# Patient Record
Sex: MALE | Race: WHITE
[De-identification: names, ages, dates, MRNs, and addresses within clinical notes are randomized per-mention and may not be internally consistent; named-entity substitution may affect disease eponyms.]

---

## 2018-09-11 ENCOUNTER — HOSPITAL ENCOUNTER (EMERGENCY)
Dept: HOSPITAL 57 - BURERS | Age: 43
Discharge: HOME | End: 2018-09-11
Payer: SELF-PAY

## 2018-09-11 DIAGNOSIS — I25.2: ICD-10-CM

## 2018-09-11 DIAGNOSIS — E78.5: ICD-10-CM

## 2018-09-11 DIAGNOSIS — F31.9: ICD-10-CM

## 2018-09-11 DIAGNOSIS — Z86.73: ICD-10-CM

## 2018-09-11 DIAGNOSIS — E11.9: ICD-10-CM

## 2018-09-11 DIAGNOSIS — I10: ICD-10-CM

## 2018-09-11 DIAGNOSIS — L03.313: Primary | ICD-10-CM

## 2018-09-11 PROCEDURE — 90715 TDAP VACCINE 7 YRS/> IM: CPT

## 2018-09-11 PROCEDURE — 87077 CULTURE AEROBIC IDENTIFY: CPT

## 2018-09-11 PROCEDURE — 87186 SC STD MICRODIL/AGAR DIL: CPT

## 2018-09-11 PROCEDURE — 87070 CULTURE OTHR SPECIMN AEROBIC: CPT

## 2018-09-11 PROCEDURE — 90471 IMMUNIZATION ADMIN: CPT

## 2018-09-11 PROCEDURE — 10060 I&D ABSCESS SIMPLE/SINGLE: CPT

## 2018-09-11 PROCEDURE — 87205 SMEAR GRAM STAIN: CPT

## 2018-11-06 ENCOUNTER — HOSPITAL ENCOUNTER (EMERGENCY)
Dept: HOSPITAL 57 - BURERS | Age: 43
Discharge: HOME | End: 2018-11-06
Payer: SELF-PAY

## 2018-11-06 DIAGNOSIS — Z79.4: ICD-10-CM

## 2018-11-06 DIAGNOSIS — I10: ICD-10-CM

## 2018-11-06 DIAGNOSIS — F31.9: ICD-10-CM

## 2018-11-06 DIAGNOSIS — Z79.891: ICD-10-CM

## 2018-11-06 DIAGNOSIS — S43.402A: Primary | ICD-10-CM

## 2018-11-06 DIAGNOSIS — Z79.82: ICD-10-CM
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
## 2018-11-06 DIAGNOSIS — Z79.899: ICD-10-CM

## 2018-11-06 NOTE — RAD
THREE VIEWS LEFT SHOULDER:

 

Comparison: None. 

 

History: Left shoulder pain after fall. 

 

FINDINGS: 

Three views of the left shoulder shows no evidence of acute fracture or dislocation. No degenerative 
changes are seen. No soft tissue swelling is present. 

 

IMPRESSION: 

Unremarkable exam. 

 

POS: [facility]

## 2018-12-20 ENCOUNTER — HOSPITAL ENCOUNTER (INPATIENT)
Dept: HOSPITAL 92 - ERS | Age: 43
LOS: 3 days | Discharge: HOME | DRG: 254 | End: 2018-12-23
Attending: FAMILY MEDICINE | Admitting: FAMILY MEDICINE
Payer: SELF-PAY

## 2018-12-20 VITALS — BODY MASS INDEX: 26.2 KG/M2

## 2018-12-20 DIAGNOSIS — F31.9: ICD-10-CM

## 2018-12-20 DIAGNOSIS — E11.65: ICD-10-CM

## 2018-12-20 DIAGNOSIS — G47.30: ICD-10-CM

## 2018-12-20 DIAGNOSIS — Z95.810: ICD-10-CM

## 2018-12-20 DIAGNOSIS — I25.5: ICD-10-CM

## 2018-12-20 DIAGNOSIS — I11.0: Primary | ICD-10-CM

## 2018-12-20 DIAGNOSIS — I25.10: ICD-10-CM

## 2018-12-20 DIAGNOSIS — I50.23: ICD-10-CM

## 2018-12-20 DIAGNOSIS — I25.2: ICD-10-CM

## 2018-12-20 LAB
ALBUMIN SERPL BCG-MCNC: 3.5 G/DL (ref 3.5–5)
ALP SERPL-CCNC: 89 U/L (ref 40–150)
ALT SERPL W P-5'-P-CCNC: (no result) U/L (ref 8–55)
ANION GAP SERPL CALC-SCNC: 14 MMOL/L (ref 10–20)
AST SERPL-CCNC: 8 U/L (ref 5–34)
BASOPHILS # BLD AUTO: 0 THOU/UL (ref 0–0.2)
BASOPHILS NFR BLD AUTO: 0.1 % (ref 0–1)
BILIRUB SERPL-MCNC: 0.6 MG/DL (ref 0.2–1.2)
BUN SERPL-MCNC: 12 MG/DL (ref 8.9–20.6)
CALCIUM SERPL-MCNC: 8.5 MG/DL (ref 7.8–10.44)
CHLORIDE SERPL-SCNC: 105 MMOL/L (ref 98–107)
CK SERPL-CCNC: 35 U/L (ref 30–200)
CO2 SERPL-SCNC: 26 MMOL/L (ref 22–29)
CREAT CL PREDICTED SERPL C-G-VRATE: 0 ML/MIN (ref 70–130)
EOSINOPHIL # BLD AUTO: 0.2 THOU/UL (ref 0–0.7)
EOSINOPHIL NFR BLD AUTO: 3.1 % (ref 0–10)
GLOBULIN SER CALC-MCNC: 3.3 G/DL (ref 2.4–3.5)
GLUCOSE SERPL-MCNC: 447 MG/DL (ref 70–105)
HGB BLD-MCNC: 10.1 G/DL (ref 14–18)
LIPASE SERPL-CCNC: 12 U/L (ref 8–78)
LYMPHOCYTES # BLD: 0.8 THOU/UL (ref 1.2–3.4)
LYMPHOCYTES NFR BLD AUTO: 14 % (ref 21–51)
MCH RBC QN AUTO: 27.7 PG (ref 27–31)
MCV RBC AUTO: 90.1 FL (ref 78–98)
MONOCYTES # BLD AUTO: 0.5 THOU/UL (ref 0.11–0.59)
MONOCYTES NFR BLD AUTO: 8.4 % (ref 0–10)
NEUTROPHILS # BLD AUTO: 4.1 THOU/UL (ref 1.4–6.5)
NEUTROPHILS NFR BLD AUTO: 74.4 % (ref 42–75)
PLATELET # BLD AUTO: 162 THOU/UL (ref 130–400)
POTASSIUM SERPL-SCNC: 4.1 MMOL/L (ref 3.5–5.1)
RBC # BLD AUTO: 3.64 MILL/UL (ref 4.7–6.1)
SODIUM SERPL-SCNC: 141 MMOL/L (ref 136–145)
TROPONIN I SERPL DL<=0.01 NG/ML-MCNC: (no result) NG/ML (ref ?–0.03)
TROPONIN I SERPL DL<=0.01 NG/ML-MCNC: (no result) NG/ML (ref ?–0.03)
WBC # BLD AUTO: 5.5 THOU/UL (ref 4.8–10.8)

## 2018-12-20 PROCEDURE — 93306 TTE W/DOPPLER COMPLETE: CPT

## 2018-12-20 PROCEDURE — 71046 X-RAY EXAM CHEST 2 VIEWS: CPT

## 2018-12-20 PROCEDURE — C9113 INJ PANTOPRAZOLE SODIUM, VIA: HCPCS

## 2018-12-20 PROCEDURE — 82550 ASSAY OF CK (CPK): CPT

## 2018-12-20 PROCEDURE — 96374 THER/PROPH/DIAG INJ IV PUSH: CPT

## 2018-12-20 PROCEDURE — 80061 LIPID PANEL: CPT

## 2018-12-20 PROCEDURE — 36415 COLL VENOUS BLD VENIPUNCTURE: CPT

## 2018-12-20 PROCEDURE — 36416 COLLJ CAPILLARY BLOOD SPEC: CPT

## 2018-12-20 PROCEDURE — 81001 URINALYSIS AUTO W/SCOPE: CPT

## 2018-12-20 PROCEDURE — 96375 TX/PRO/DX INJ NEW DRUG ADDON: CPT

## 2018-12-20 PROCEDURE — 74176 CT ABD & PELVIS W/O CONTRAST: CPT

## 2018-12-20 PROCEDURE — 87086 URINE CULTURE/COLONY COUNT: CPT

## 2018-12-20 PROCEDURE — 85025 COMPLETE CBC W/AUTO DIFF WBC: CPT

## 2018-12-20 PROCEDURE — 83690 ASSAY OF LIPASE: CPT

## 2018-12-20 PROCEDURE — 83880 ASSAY OF NATRIURETIC PEPTIDE: CPT

## 2018-12-20 PROCEDURE — 93005 ELECTROCARDIOGRAM TRACING: CPT

## 2018-12-20 PROCEDURE — 06H03DZ INSERTION OF INTRALUMINAL DEVICE INTO INFERIOR VENA CAVA, PERCUTANEOUS APPROACH: ICD-10-PCS | Performed by: INTERNAL MEDICINE

## 2018-12-20 PROCEDURE — 80053 COMPREHEN METABOLIC PANEL: CPT

## 2018-12-20 PROCEDURE — 84443 ASSAY THYROID STIM HORMONE: CPT

## 2018-12-20 PROCEDURE — 87186 SC STD MICRODIL/AGAR DIL: CPT

## 2018-12-20 PROCEDURE — 84484 ASSAY OF TROPONIN QUANT: CPT

## 2018-12-20 RX ADMIN — INSULIN LISPRO PRN UNIT: 100 INJECTION, SOLUTION INTRAVENOUS; SUBCUTANEOUS at 16:51

## 2018-12-20 NOTE — RAD
PA AND LATERAL VIEWS CHEST:

 

Date:  12/20/18 

 

HISTORY:  

Chest pain. 

 

FINDINGS/IMPRESSION: 

There is a left-sided AICD. The heart size is borderline. No pneumothoraces are seen. There is sugges
tion of small posterior pleural effusions with adjacent posterior opacities in the lung bases. 

 

 

POS: [facility]

## 2018-12-20 NOTE — HP
PRIMARY CARE PHYSICIAN:  Dr. Avendano.



CHIEF COMPLAINT:  Chest pain and shortness of breath.



HISTORY OF PRESENT ILLNESS:  Mr. Gonzalez is a very unfortunate 43-year-old male, who

presented to the emergency room via EMS for heartburn and chest pain, onset this

morning.  Reports shortness of breath and chest pain intermittent for the last 3

weeks.  Reports that the pain is localized at the center of his chest and is

exacerbated by lying flat.  He reports it is associated with shortness of breath,

orthopnea, cough, right flank pain.  Denies any fever, dysuria, or abdominal pain.

Does have a significant cardiac history including coronary artery disease, stents

x4, had an AICD placed in 2010.  He has had 2 CVAs in 2016, one in September and one

in November.  Has an IVC filter in his right leg, which he had for DVT that he

developed after his last CVA.  Has a significant family history of coronary artery

disease.  Brother has had a CABG.  He has both parents.  Both grandparents also have

coronary artery disease.  He states that his last EF was __________ %.  He has had a

myocardial infarction, reports the last time his AICD fired was in 2016.  Reports

that he is from [place] and moved to the area in March and he has seen Dr. Avendano.

Currently his insurance is being re-established.  He does report taking a baby

aspirin daily.  He has also has a history of diabetes.  Reports that he is allergic

to Plavix and cannot take anything stronger than aspirin as the Coumadin and other

anticoagulant medications make him bleed internally.  His EKG in the ER showed a

normal sinus rhythm, beats per minute were 90, has left axis deviation, nonspecific

T-wave abnormality.  No ST elevation.  Based on history and current symptoms, the

patient is admitted to the observation unit for further management. 



PAST MEDICAL HISTORY:  As noted above.  

1. Hypertension.

2. Hyperlipidemia.

3. Diabetes.

4. CVA x2.

5. Stents x4.

6. MI.

7. IVC filter.

8. AICD placement.

9. CHF with an EF of 8%.



PAST SURGICAL HISTORY:  

1. Cardiac stent x4.

2. AICD.

3. IVC filter.

4. Appendectomy.

5. Cholecystectomy.

6. Lazy eye surgery bilaterally.



PSYCH HISTORY:  Includes bipolar and depression.



SOCIAL HISTORY:  Denies alcohol use.  Denies drug use.  No smoking history.  Reports

that he lives with his brother in [place] and does rely on family to help him

ambulate and with ADLs. 



REVIEW OF SYSTEMS:  The patient reports that he had some heartburn this morning,

which is resolved after medication.  Reports that he currently does not have any

chest pain or palpitations.  Does report some shortness of breath.  Reports while he

was in the ER they came up with oxygen on him because his sats dipped into the 80s

when he was asleep.  Reports that he was scheduled for a sleep study in [place], but

never obtained one.  Denies any fevers or chills.  Does report some intermittent

flank pain.  Denies any abdominal pain per se.  No nausea, vomiting, or diarrhea. 



All other review of systems were reviewed and are negative unless mentioned in the

HPI. 



PHYSICAL EXAMINATION:

VITAL SIGNS:  Blood pressure 116/79, pulse is 86, respirations are 20, temp is 98.5,

pulse ox 98% on room air, although that does dip when he is sleeping.  During exam

did drop down to 91% on room air. 

CONSTITUTIONAL:  The patient is in no acute distress.  The patient is pale and does

appear ill. 

HEENT:  Head is atraumatic and normocephalic.  Eyes, pupils are equally round and

reactive to light.  ENT; mucous membranes are moist.  Does have poor dentition. 

NECK:  Trachea is midline.  No meningeal signs.  No cervical adenopathy. 

RESPIRATORY/CHEST:  In no respiratory distress.  Faint rales noted to bilateral

lobes.  No rhonchi.  Chest movement is symmetrical. 

CARDIOVASCULAR:  S1 and S2.  Does have a 2/6 murmur. 

BACK:  Normal inspection.  Normal range of motion.  No tenderness. 

ABDOMEN:  Nontender on palpation.  Bowel sounds are heard.   

EXTREMITIES:  Upper extremities; normal range of motion, motor strength is normal,

sensation intact, and radial pulses are normal.  Lower extremities; inspection is

normal, normal range of motion, strength is equal bilaterally 4/5, pedal pulses are

normal.  No edema is noted. 

NEURO:  The patient is oriented to person, place, and time.  Cranial nerves 2

through 10 are grossly intact. 

SKIN:  Warm, dry, has mild pallor.  No rash is noted.



ALLERGIES:  TO PLAVIX.



CURRENT MEDICATIONS:  

1. Aspirin 81 mg p.o. daily.  

2. __________ p.o. daily.

3. Coreg 6.25 mg b.i.d.

4. Lasix 40 mg p.o. once daily.

5. Divalproex 500 mg 1 tablet q.12.

6. Gabapentin 300 mg t.i.d.

7. Glipizide 5 mg b.i.d.

8. Levemir 20 units b.i.d.

9. Flomax 0.4 mg daily.

10. Prozac 20 mg one cap once a day.  

11. Metolazone 5 mg once a day.

12. NovoLog given as a sliding scale.



PERTINENT LABS:  Troponin x2 are undetectable.  BNP is 1192.  White blood cell count

is 5.5, hemoglobin is 10.1, and hematocrit is 32.8.  Sodium 141, potassium 4.1,

chloride 105, gap is 14, BUN is 12, and creatinine 0.92.  Estimated GFR is 90.

Glucose checked at 8 this morning was 447.  Calcium 8.5.  Liver enzymes are

unremarkable. 



DIAGNOSTIC DATA:  The patient did have a chest x-ray, which showed a left-sided

AICD.  Heart size is borderline.  Suggestion of a small posterior pleural effusions

with adjacent posterior opacities in the lung bases. 



ASSESSMENT AND PLAN:  

1. Chest pain.  With the patient's significant cardiac history, we will obtain an

echocardiogram and ask Cardiology to give their input. 

2. Congestive heart failure.  We will continue Lasix.  We will monitor input and

output. 

3. Hypertension.  We will continue home medications.  

4. Diabetes type 2.  We will continue home medications and a sliding scale as needed

for hyperglycemia. 

5. Bipolar disorder.  We will continue his home medications.

6. Deep vein thrombosis and gastrointestinal prophylaxis will be ordered as

necessary. 

7. Hospital course will be dependent on clinical findings.







Job ID:  [id number]

## 2018-12-21 LAB
ALBUMIN SERPL BCG-MCNC: 3.5 G/DL (ref 3.5–5)
ALP SERPL-CCNC: 87 U/L (ref 40–150)
ALT SERPL W P-5'-P-CCNC: (no result) U/L (ref 8–55)
ANION GAP SERPL CALC-SCNC: 13 MMOL/L (ref 10–20)
AST SERPL-CCNC: 8 U/L (ref 5–34)
BASOPHILS # BLD AUTO: 0.1 THOU/UL (ref 0–0.2)
BASOPHILS NFR BLD AUTO: 0.8 % (ref 0–1)
BILIRUB SERPL-MCNC: 0.7 MG/DL (ref 0.2–1.2)
BUN SERPL-MCNC: 11 MG/DL (ref 8.9–20.6)
CALCIUM SERPL-MCNC: 8.7 MG/DL (ref 7.8–10.44)
CHD RISK SERPL-RTO: 2.8 (ref ?–4.5)
CHLORIDE SERPL-SCNC: 101 MMOL/L (ref 98–107)
CHOLEST SERPL-MCNC: 89 MG/DL
CO2 SERPL-SCNC: 27 MMOL/L (ref 22–29)
CREAT CL PREDICTED SERPL C-G-VRATE: 142 ML/MIN (ref 70–130)
CRYSTAL-AUWI FLAG: 0 (ref 0–15)
EOSINOPHIL # BLD AUTO: 0.2 THOU/UL (ref 0–0.7)
EOSINOPHIL NFR BLD AUTO: 3.6 % (ref 0–10)
GLOBULIN SER CALC-MCNC: 3.3 G/DL (ref 2.4–3.5)
GLUCOSE SERPL-MCNC: 188 MG/DL (ref 70–105)
GLUCOSE UR STRIP-MCNC: 500 MG/DL
HDLC SERPL-MCNC: 32 MG/DL
HEV IGM SER QL: 7.4 (ref 0–7.99)
HGB BLD-MCNC: 9.9 G/DL (ref 14–18)
HYALINE CASTS #/AREA URNS LPF: (no result) LPF
LDLC SERPL CALC-MCNC: 47 MG/DL
LYMPHOCYTES # BLD: 1.5 THOU/UL (ref 1.2–3.4)
LYMPHOCYTES NFR BLD AUTO: 22.4 % (ref 21–51)
MCH RBC QN AUTO: 28.3 PG (ref 27–31)
MCV RBC AUTO: 87.3 FL (ref 78–98)
MONOCYTES # BLD AUTO: 0.5 THOU/UL (ref 0.11–0.59)
MONOCYTES NFR BLD AUTO: 7.9 % (ref 0–10)
NEUTROPHILS # BLD AUTO: 4.5 THOU/UL (ref 1.4–6.5)
NEUTROPHILS NFR BLD AUTO: 65.3 % (ref 42–75)
PATHC CAST-AUWI FLAG: 1.45 (ref 0–2.49)
PLATELET # BLD AUTO: 152 THOU/UL (ref 130–400)
POTASSIUM SERPL-SCNC: 4.1 MMOL/L (ref 3.5–5.1)
PROT UR STRIP.AUTO-MCNC: 100 MG/DL
RBC # BLD AUTO: 3.49 MILL/UL (ref 4.7–6.1)
RBC UR QL AUTO: (no result) HPF (ref 0–3)
SODIUM SERPL-SCNC: 137 MMOL/L (ref 136–145)
SP GR UR STRIP: 1.02 (ref 1–1.04)
SPERM-AUWI FLAG: 0 (ref 0–9.9)
TRIGL SERPL-MCNC: 52 MG/DL (ref ?–150)
WBC # BLD AUTO: 6.8 THOU/UL (ref 4.8–10.8)
WBC UR QL AUTO: (no result) HPF (ref 0–3)
YEAST-AUWI FLAG: 0 (ref 0–25)

## 2018-12-21 RX ADMIN — INSULIN LISPRO PRN UNIT: 100 INJECTION, SOLUTION INTRAVENOUS; SUBCUTANEOUS at 17:05

## 2018-12-21 RX ADMIN — INSULIN LISPRO PRN UNIT: 100 INJECTION, SOLUTION INTRAVENOUS; SUBCUTANEOUS at 05:08

## 2018-12-21 RX ADMIN — INSULIN GLARGINE SCH MLS: 100 INJECTION, SOLUTION SUBCUTANEOUS at 21:49

## 2018-12-21 NOTE — PDOC.CTH
Cardiology Progress Note





- Subjective





Feels better overall.  Less SOB but still present





- Objective


 Vital Signs











  Temp Pulse Resp BP Pulse Ox


 


 12/21/18 11:27  97.4 F L  83  20  106/71  97


 


 12/21/18 07:26  98.1 F  85  16  104/69  96


 


 12/21/18 03:09  98.9 F  86  20  108/71  99








 











Weight                         186 lb 1.6 oz














 











 12/20/18 12/21/18 12/22/18





 06:59 06:59 06:59


 


Intake Total  756 


 


Balance  756 














- Physical Examination


General/Neuro: alert & oriented x3, NAD


Neck: no JVD present


Lungs: other: (crakcles bilaterally)


Heart: RRR


Abdomen: NT/ND, soft


Extremities: + femoral B





- Labs


Result Diagrams: 


 12/21/18 05:30





 12/21/18 05:30


 Troponin/CKMB











Troponin I  Less than  0.010 ng/mL (< 0.028)   12/20/18  14:38    














- Assessment/Plan





Ischemia CM


CAD


s/p stent


DM with end organ damage





Continue diuresis


Pt states he has been on coreg in the past but BP goes to 70 systolic


May attempt at diuresis followed by very low dose coreg or ACEI


Very complex situation given comorbidities and end organ damage from DM

## 2018-12-21 NOTE — PDOC.PN
- Subjective


Encounter Start Date: 12/21/18


Encounter Start Time: 10:45


Subjective: Patient ambulating in the chou with a cane


-: Examined this morning, denies complaints





- Objective


Vital Signs & Weight: 


 Vital Signs (12 hours)











  Temp Pulse Resp BP BP Pulse Ox


 


 12/21/18 15:11  98.6 F  79  16  101/66   100


 


 12/21/18 11:27  97.4 F L  83  20   106/71  97


 


 12/21/18 07:26  98.1 F  85  16   104/69  96








 Weight











Weight                         84.414 kg














I&O: 


 











 12/20/18 12/21/18 12/22/18





 06:59 06:59 06:59


 


Intake Total  756 


 


Balance  756 











Result Diagrams: 


 12/21/18 05:30





 12/21/18 05:30


Additional Labs: 


 Accuchecks











  12/21/18 12/21/18 12/20/18





  11:30 05:03 20:11


 


POC Glucose  230 H  190 H  265 H














Phys Exam





- Physical Examination


HEENT: PERRLA, moist MMs


Neck: no nodes, no JVD


Respiratory: no wheezing, clear to auscultation bilateral


Cardiovascular: RRR, no significant murmur


Gastrointestinal: soft, non-tender


Musculoskeletal: pulses present, edema present


+1


Neurological: non-focal, normal sensation


Lymphatic: no nodes


Psychiatric: normal affect, A&O x 3


Skin: no rash, normal turgor





Dx/Plan


(1) Acute on chronic congestive heart failure


Code(s): I50.9 - HEART FAILURE, UNSPECIFIED   Status: Acute   





(2) Diabetes mellitus


Code(s): E11.9 - TYPE 2 DIABETES MELLITUS WITHOUT COMPLICATIONS   Status: 

Chronic   





(3) Hypertension


Code(s): I10 - ESSENTIAL (PRIMARY) HYPERTENSION   Status: Chronic   





(4) Sleep apnea


Code(s): G47.30 - SLEEP APNEA, UNSPECIFIED   Status: Chronic   





(5) Chest pain


Code(s): R07.9 - CHEST PAIN, UNSPECIFIED   Status: Acute   





- Plan


cont current plan of care


Awaiting ECHO, Dr. Adame is consulted 


-: Hematuria, will refer to Urology as an outpatient


-: Will need sleep study as an outpatient


-: Will monitor VS/labs, await further recommendations from Cardiology





* .

## 2018-12-21 NOTE — CT
NONCONTRAST CT ABDOMEN AND PELVIS:

 

Date:  12/21/18 

 

HISTORY:  

Bilateral flank pain, greater on the right. Symptoms have been present for 3 weeks. History of append
ectomy and cholecystectomy. 

 

FINDINGS:

There is a single AICD lead noted in place within the right ventricle. There is moderately large righ
t and moderate sized left pleural effusions with associated bibasilar parenchymal change, probably re
lated to passive atelectasis. 

 

A small pericardial effusion is present. The heart is mildly enlarged. 

 

Post cholecystectomy changes are noted. 

 

Vascular calcifications are seen in the abdominal aorta and iliac arteries. An inferior vena cava malika
ter is noted in place. One of the struts of the IVC filter does appear to extend outside of the confi
sean of the IVC and extends just posterior to the abdominal aorta. There is also suggestion of one of 
the anterior struts of the IVC filter extending just slightly anterior to the IVC. The IVC filter is 
tilted within the IVC. 

 

There is an area of minimal increased density seen involving the anterolateral aspect of the body of 
the spleen, which could be related to an area of mild scarring and calcification. Spleen otherwise ha
s a grossly normal nonenhanced CT appearance. 

 

The liver, pancreas, bilateral adrenal glands, right kidney, and urinary bladder demonstrate a grossl
y normal nonenhanced CT appearance. A 1.3 cm exophytic fat density structure is seen at the posterior
 aspect of the mid portion of left kidney. There is a small adjacent punctate calcification as well. 
This has more of an appearance of an area of adjacent fat necrosis, but this may represent a small an
giomyolipoma. No renal or ureteral calculi are seen bilaterally, and there is no hydronephrosis prese
nt. 

 

Small amount of free fluid is seen in the pelvis. There is mild generalized stranding and haziness wi
thin the mesentery which may be related to minimal mesenteric edema. Minimal nonspecific subcutaneous
 edema is present. 

 

No dilated loops of small bowel are present. 

 

Mild degenerative changes are seen in the spine. 

 

IMPRESSION: 

1.  Moderately large right and moderate left pleural effusions with associated passive atelectasis. 

 

2.  Mild cardiomegaly with tiny pericardial effusion. 

 

3.  Findings suggesting mild anasarca, including what appears to be minimal stranding and edema withi
n the mesentery. 

 

4.  No renal or ureteral calculi are seen bilaterally, and there is no hydronephrosis present. 

 

5.  Minimal amount of ascites in the pelvis. 

 

6.  Fat density structure posterior aspect of the mid portion of the left kidney. This may represent 
a small angiomyolipoma. This measures 1.3 cm. However, given the appearance of this structure, this a
lso has an appearance suggestive of fat necrosis. There are adjacent minimal calcifications. Clinical
 correlation for prior surgery involving the left kidney is suggested. 

 

7.  IVC filter noted in place which is tilted within the IVC. One of the lateral struts extends outsi
de of the confines of the IVC and is located posterior to the abdominal aorta with one of the anterio
r struts minimally extending outside of the confines of the anterior wall of the IVC into the adjacen
t fat. 

 

 

 

POS: [facility]

## 2018-12-21 NOTE — CON
DATE OF CONSULTATION:  12/20/2018



REASON FOR CONSULTATION:  Acute on chronic systolic heart failure.



HISTORY OF PRESENT ILLNESS:  Mr. Gonzalez is an unfortunate 43-year-old gentleman with

previous history of ischemic cardiomyopathy.  Per Mr. Gonzalez, his history states his

LVEF has been in the 5% to 10% range.  This was diagnosed in 2010.  He has also had

previous MI in addition to stent placement.  Both he and his wife moved in with his

brother. 



He states in the last several weeks, he has had increased lower extremity edema,

PND, and orthopnea.  Positive for shortness of breath.  He also states he has chest

pain noted with lying down, better with sitting up.  It is not felt to be

exertional. 



PAST MEDICAL HISTORY:  As above including AICD placement, IVC filter, previous MI,

stent placement x4, previous CVA, diabetes mellitus, hyperlipidemia, and

hypertension, appendectomy, and cholecystectomy. 



SOCIAL HISTORY:  No current tobacco or alcohol use.



REVIEW OF SYSTEMS:  Ten-point review of systems is reviewed and as above, negative.



PHYSICAL EXAMINATION:

GENERAL:  Patient is a pleasant male, who is in no acute distress.  The patient

appears their stated age. 

VITAL SIGNS:  Blood pressure 108/71, pulse 86, and temperature afebrile. 

NEUROLOGIC:  The patient is alert and oriented x3 with no focal neurologic deficits. 

HEENT:  Dysconjugate gaze, likely due to retinopathy. 

NECK:  No JVD.  Carotid upstroke brisk.  No bruits bilaterally. 

LUNGS:  Crackles noted bilaterally. 

BACK:  No scoliosis or kyphosis. 

CARDIAC:  Regular rate and rhythm with normal S1 and S2.  No S3 or S4 noted.  No

significant rubs, murmurs, thrills, or gallops noted throughout the precordium.  PMI

is not displaced.  There is no parasternal heave. 

ABDOMEN:  Soft, nontender, nondistended.  No peritoneal signs present.  No

hepatosplenomegaly.  No abnormal striae. 

EXTREMITIES:  2+ femoral and 2+ dorsalis pedis pulses.  No cyanosis, clubbing.  1 to

2+ pitting edema. 

SKIN:  No gross abnormalities.



PERTINENT LABORATORY DATA:  Hemoglobin 9.9, creatinine 0.88.  Troponin negative.

BNP 1192. 



IMPRESSION:  

1. Acute on chronic systolic heart failure.

2. Coronary artery disease.

3. Ischemic cardiomyopathy.

4. Status post ICD placement.

5. Diabetes mellitus with end-stage disease.



RECOMMENDATIONS:  Mr. Gonzalez appears to have end-stage cardiomyopathy.  I would

recommend Lasix 40 mg IV b.i.d.  We would add low-dose beta-blocker therapy if blood

pressure tolerates.  Blood pressure is currently marginal and with adding Lasix may

decrease, so we will hold to assess treatment with Lasix therapy.  His symptoms of

chest pain are not felt to be anginal in nature.  They appear to be positional.

This is likely related to underlying acute systolic heart failure. 







Job ID:  [id number]

## 2018-12-22 LAB
ALBUMIN SERPL BCG-MCNC: 3.5 G/DL (ref 3.5–5)
ALP SERPL-CCNC: 80 U/L (ref 40–150)
ALT SERPL W P-5'-P-CCNC: (no result) U/L (ref 8–55)
ANION GAP SERPL CALC-SCNC: 15 MMOL/L (ref 10–20)
AST SERPL-CCNC: 11 U/L (ref 5–34)
BASOPHILS # BLD AUTO: 0.1 THOU/UL (ref 0–0.2)
BASOPHILS NFR BLD AUTO: 1.2 % (ref 0–1)
BILIRUB SERPL-MCNC: 0.6 MG/DL (ref 0.2–1.2)
BUN SERPL-MCNC: 14 MG/DL (ref 8.9–20.6)
CALCIUM SERPL-MCNC: 8.7 MG/DL (ref 7.8–10.44)
CHLORIDE SERPL-SCNC: 102 MMOL/L (ref 98–107)
CO2 SERPL-SCNC: 26 MMOL/L (ref 22–29)
CREAT CL PREDICTED SERPL C-G-VRATE: 136 ML/MIN (ref 70–130)
EOSINOPHIL # BLD AUTO: 0.3 THOU/UL (ref 0–0.7)
EOSINOPHIL NFR BLD AUTO: 6 % (ref 0–10)
GLOBULIN SER CALC-MCNC: 3.4 G/DL (ref 2.4–3.5)
GLUCOSE SERPL-MCNC: 157 MG/DL (ref 70–105)
HGB BLD-MCNC: 10.1 G/DL (ref 14–18)
LYMPHOCYTES # BLD: 1.3 THOU/UL (ref 1.2–3.4)
LYMPHOCYTES NFR BLD AUTO: 26.4 % (ref 21–51)
MCH RBC QN AUTO: 28.4 PG (ref 27–31)
MCV RBC AUTO: 87.6 FL (ref 78–98)
MONOCYTES # BLD AUTO: 0.5 THOU/UL (ref 0.11–0.59)
MONOCYTES NFR BLD AUTO: 9.3 % (ref 0–10)
NEUTROPHILS # BLD AUTO: 2.9 THOU/UL (ref 1.4–6.5)
NEUTROPHILS NFR BLD AUTO: 57.1 % (ref 42–75)
PLATELET # BLD AUTO: 166 THOU/UL (ref 130–400)
POTASSIUM SERPL-SCNC: 3.9 MMOL/L (ref 3.5–5.1)
RBC # BLD AUTO: 3.56 MILL/UL (ref 4.7–6.1)
SODIUM SERPL-SCNC: 139 MMOL/L (ref 136–145)
WBC # BLD AUTO: 5.1 THOU/UL (ref 4.8–10.8)

## 2018-12-22 RX ADMIN — INSULIN GLARGINE SCH MLS: 100 INJECTION, SOLUTION SUBCUTANEOUS at 08:09

## 2018-12-22 RX ADMIN — Medication SCH ML: at 21:17

## 2018-12-22 RX ADMIN — INSULIN LISPRO PRN UNIT: 100 INJECTION, SOLUTION INTRAVENOUS; SUBCUTANEOUS at 08:09

## 2018-12-22 RX ADMIN — ASPIRIN SCH MG: 81 TABLET ORAL at 08:12

## 2018-12-22 RX ADMIN — INSULIN GLARGINE SCH MLS: 100 INJECTION, SOLUTION SUBCUTANEOUS at 21:17

## 2018-12-22 RX ADMIN — INSULIN LISPRO PRN UNIT: 100 INJECTION, SOLUTION INTRAVENOUS; SUBCUTANEOUS at 18:18

## 2018-12-22 NOTE — PDOC.PN
- Subjective


Encounter Start Date: 12/22/18


Encounter Start Time: 09:15


Subjective: sob is better, is amb in room


-: no palp or chest pain, wife at bedside





- Objective


MAR Reviewed: Yes


Vital Signs & Weight: 


 Vital Signs (12 hours)











  Temp Pulse Resp BP BP Pulse Ox


 


 12/22/18 11:32   80  18   103/62  100


 


 12/22/18 08:12  97.5 F L     


 


 12/22/18 07:12   79  16  103/65   100


 


 12/22/18 04:00  97.8 F  76  18  105/72   98


 


 12/22/18 00:00       90 L








 Weight











Weight                         182 lb














I&O: 


 











 12/21/18 12/22/18 12/23/18





 06:59 06:59 06:59


 


Intake Total 756 310 


 


Balance 756 310 











Result Diagrams: 


 12/22/18 04:37





 12/22/18 04:37


Additional Labs: 


 Accuchecks











  12/22/18 12/22/18 12/21/18





  10:27 05:25 20:45


 


POC Glucose  139 H  174 H  203 H














  12/21/18





  11:30


 


POC Glucose  230 H














Phys Exam





- Physical Examination


HEENT: PERRLA, moist MMs


Neck: no JVD, supple


Respiratory: no wheezing


rales+


Cardiovascular: RRR, no significant murmur


Gastrointestinal: soft, non-tender, positive bowel sounds


Musculoskeletal: pulses present, edema present


Neurological: non-focal, moves all 4 limbs


Psychiatric: normal affect, A&O x 3





Dx/Plan


(1) Acute on chronic congestive heart failure


Code(s): I50.9 - HEART FAILURE, UNSPECIFIED   Status: Acute   


Qualifiers: 


   Heart failure type: systolic   Qualified Code(s): I50.23 - Acute on chronic 

systolic (congestive) heart failure   


Comment: ef of 10%, severe isch cardiomyopathy   





(2) CAD (coronary artery disease)


Code(s): I25.10 - ATHSCL HEART DISEASE OF NATIVE CORONARY ARTERY W/O ANG PCTRS 

  Status: Chronic   


Qualifiers: 


   Coronary Disease-Associated Artery/Lesion type: native artery   Native vs. 

transplanted heart: native heart   Associated angina: without angina   

Qualified Code(s): I25.10 - Atherosclerotic heart disease of native coronary 

artery without angina pectoris   


Comment: prior stents x4   





(3) Bipolar disorder


Code(s): F31.9 - BIPOLAR DISORDER, UNSPECIFIED   Status: Chronic   


Qualifiers: 


   Active/Remission status: remission status unspecified   Qualified Code(s): 

F31.9 - Bipolar disorder, unspecified   





(4) h/o ivc filter


Status: Chronic   Comment: one of the leads is post to aorta? and is outside 

ivc   





(5) Chest pain


Code(s): R07.9 - CHEST PAIN, UNSPECIFIED   Status: Resolved   


Qualifiers: 


   Chest pain type: unspecified   Qualified Code(s): R07.9 - Chest pain, 

unspecified   





(6) Diabetes mellitus


Code(s): E11.9 - TYPE 2 DIABETES MELLITUS WITHOUT COMPLICATIONS   Status: 

Chronic   


Qualifiers: 


   Diabetes mellitus type: type 1   Diabetes mellitus complication status: with 

unspecified complications   Qualified Code(s): E10.8 - Type 1 diabetes mellitus 

with unspecified complications   





(7) Hypertension


Code(s): I10 - ESSENTIAL (PRIMARY) HYPERTENSION   Status: Chronic   


Qualifiers: 


   Hypertension type: essential hypertension   Qualified Code(s): I10 - 

Essential (primary) hypertension   





(8) Sleep apnea


Code(s): G47.30 - SLEEP APNEA, UNSPECIFIED   Status: Suspected   Comment: needs 

outpt sleep study   





- Plan


on lasix iv, metolazone


-: continue asp, lipitor, depakote and lantus 20u bid


-: sbp is low to add any BB or ACE/ARB's


-: edison hose to LE to reduce edema


-: to ambulate in hallway as tolerated, dc plan in am





* .








Review of Systems





- Medications/Allergies


Allergies/Adverse Reactions: 


 Allergies











Allergy/AdvReac Type Severity Reaction Status Date / Time


 


clopidogrel [From Plavix] Allergy   Verified 12/20/18 19:00











Medications: 


 Current Medications





Acetaminophen (Tylenol)  650 mg PO Q4H PRN


   PRN Reason: Headache/Fever/Mild Pain (1-3)


   Last Admin: 12/21/18 07:06 Dose:  650 mg


Aspirin (Ecotrin)  81 mg PO DAILY [facility]


   Last Admin: 12/22/18 08:12 Dose:  81 mg


Atorvastatin Calcium (Lipitor)  10 mg PO HS [facility]


   Last Admin: 12/21/18 21:54 Dose:  10 mg


Dextrose/Water (Dextrose 50%)  25 gm SLOW IVP PRN PRN


   PRN Reason: Hypoglycemia


Divalproex Sodium (Depakote Er)  500 mg PO Q12HR [facility]


   Last Admin: 12/22/18 08:12 Dose:  500 mg


Famotidine (Pepcid)  20 mg PO BID [facility]


   Last Admin: 12/22/18 08:11 Dose:  20 mg


Fluoxetine HCl (Prozac)  20 mg PO DAILY [facility]


   Last Admin: 12/22/18 08:11 Dose:  20 mg


Furosemide (Lasix)  40 mg PO 0900,1400 [facility]


   Last Admin: 12/22/18 08:12 Dose:  40 mg


Gabapentin (Neurontin)  300 mg PO TID [facility]


   Last Admin: 12/22/18 08:11 Dose:  300 mg


Glipizide (Glucotrol)  5 mg PO BID [facility]


   Last Admin: 12/22/18 08:11 Dose:  5 mg


Glucagon (Glucagon)  1 mg IM PRN PRN


   PRN Reason: Hypoglycemia


Dextrose/Water (D5w)  1,000 mls @ 0 mls/hr IV .Q0M PRN


   PRN Reason: Hypoglycemia


Insulin Glargine 20 units/ (Miscellaneous Medication)  0.2 mls @ 0 mls/hr SC 

BID [facility]


   Last Admin: 12/22/18 08:09 Dose:  0.2 mls


Insulin Human Lispro (Humalog)  0 units SC .MODERATE SLIDING SC PRN


   PRN Reason: Moderate Correctional Scale


   Last Admin: 12/22/18 08:09 Dose:  2 unit


Insulin Human Lispro (Humalog)  0 units SC .BEDTIME SLIDING SC PRN; Protocol


   PRN Reason: BEDTIME SLIDING SCALE


   Last Admin: 12/20/18 21:01 Dose:  3 unit


Metolazone (Zaroxolyn)  5 mg PO DAILY [facility]


   Last Admin: 12/22/18 08:11 Dose:  5 mg


Senna/Docusate Sodium (Senokot S)  2 tab PO BIDPRN PRN


   PRN Reason: Constipation


Sodium Chloride (Flush - Normal Saline)  10 ml IVF Q12HR [facility]


Sodium Chloride (Flush - Normal Saline)  10 ml IVF PRN PRN


   PRN Reason: Saline Flush


Tamsulosin HCl (Flomax)  0.4 mg PO DAILY [facility]


   Last Admin: 12/22/18 08:12 Dose:  0.4 mg


Tramadol HCl (Ultram)  75 mg PO BID PRN


   PRN Reason: Pain


Trazodone HCl (Desyrel)  50 mg PO HS PRN


   PRN Reason: Insomnia

## 2018-12-23 VITALS — DIASTOLIC BLOOD PRESSURE: 72 MMHG | SYSTOLIC BLOOD PRESSURE: 106 MMHG | TEMPERATURE: 97.5 F

## 2018-12-23 LAB
ALBUMIN SERPL BCG-MCNC: 3.2 G/DL (ref 3.5–5)
ALP SERPL-CCNC: 79 U/L (ref 40–150)
ALT SERPL W P-5'-P-CCNC: (no result) U/L (ref 8–55)
ANION GAP SERPL CALC-SCNC: 15 MMOL/L (ref 10–20)
AST SERPL-CCNC: 10 U/L (ref 5–34)
BASOPHILS # BLD AUTO: 0 THOU/UL (ref 0–0.2)
BASOPHILS NFR BLD AUTO: 0.5 % (ref 0–1)
BILIRUB SERPL-MCNC: 0.4 MG/DL (ref 0.2–1.2)
BUN SERPL-MCNC: 16 MG/DL (ref 8.9–20.6)
CALCIUM SERPL-MCNC: 8.4 MG/DL (ref 7.8–10.44)
CHLORIDE SERPL-SCNC: 103 MMOL/L (ref 98–107)
CO2 SERPL-SCNC: 26 MMOL/L (ref 22–29)
CREAT CL PREDICTED SERPL C-G-VRATE: 153 ML/MIN (ref 70–130)
EOSINOPHIL # BLD AUTO: 0.3 THOU/UL (ref 0–0.7)
EOSINOPHIL NFR BLD AUTO: 6.4 % (ref 0–10)
GLOBULIN SER CALC-MCNC: 3.2 G/DL (ref 2.4–3.5)
GLUCOSE SERPL-MCNC: 102 MG/DL (ref 70–105)
HGB BLD-MCNC: 9.5 G/DL (ref 14–18)
LYMPHOCYTES # BLD: 1.5 THOU/UL (ref 1.2–3.4)
LYMPHOCYTES NFR BLD AUTO: 29.9 % (ref 21–51)
MCH RBC QN AUTO: 28.2 PG (ref 27–31)
MCV RBC AUTO: 88.2 FL (ref 78–98)
MONOCYTES # BLD AUTO: 0.6 THOU/UL (ref 0.11–0.59)
MONOCYTES NFR BLD AUTO: 12.4 % (ref 0–10)
NEUTROPHILS # BLD AUTO: 2.6 THOU/UL (ref 1.4–6.5)
NEUTROPHILS NFR BLD AUTO: 50.9 % (ref 42–75)
PLATELET # BLD AUTO: 166 THOU/UL (ref 130–400)
POTASSIUM SERPL-SCNC: 3.9 MMOL/L (ref 3.5–5.1)
RBC # BLD AUTO: 3.35 MILL/UL (ref 4.7–6.1)
SODIUM SERPL-SCNC: 140 MMOL/L (ref 136–145)
WBC # BLD AUTO: 5.2 THOU/UL (ref 4.8–10.8)

## 2018-12-23 RX ADMIN — Medication SCH ML: at 08:41

## 2018-12-23 RX ADMIN — ASPIRIN SCH MG: 81 TABLET ORAL at 08:38

## 2018-12-23 RX ADMIN — INSULIN GLARGINE SCH MLS: 100 INJECTION, SOLUTION SUBCUTANEOUS at 08:39

## 2018-12-23 NOTE — PDOC.PN
- Subjective


Encounter Start Date: 12/23/18


Encounter Start Time: 08:45


Subjective: sob is better, no chest pain or palp


-: is sitting and eating breakfast, wife at bedside





- Objective


MAR Reviewed: Yes


Vital Signs & Weight: 


 Vital Signs (12 hours)











  Temp Pulse Resp BP BP Pulse Ox


 


 12/23/18 11:40  97.5 F L  84  16  106/72   100


 


 12/23/18 08:45       97


 


 12/23/18 08:36  97.6 F  79  16   94/63  97


 


 12/23/18 04:00  97.8 F  78  18  102/60   95








 Weight











Weight                         185 lb 12.8 oz














I&O: 


 











 12/22/18 12/23/18 12/24/18





 06:59 06:59 06:59


 


Intake Total 310 670 


 


Output Total  950 


 


Balance 310 -280 











Result Diagrams: 


 12/23/18 05:10





 12/23/18 05:10


Additional Labs: 


 Accuchecks











  12/23/18 12/23/18 12/22/18





  10:50 05:46 23:28


 


POC Glucose  195 H  105  155 H














  12/22/18 12/22/18





  20:52 16:44


 


POC Glucose  151 H  291 H














Phys Exam





- Physical Examination


HEENT: PERRLA, moist MMs


Neck: no JVD, supple


Respiratory: no wheezing, no rales


Cardiovascular: RRR, no significant murmur


Gastrointestinal: soft, non-tender, positive bowel sounds


Musculoskeletal: pulses present, edema present


Neurological: non-focal, moves all 4 limbs


Psychiatric: normal affect, A&O x 3





Dx/Plan


(1) Acute on chronic congestive heart failure


Code(s): I50.9 - HEART FAILURE, UNSPECIFIED   Status: Acute   


Qualifiers: 


   Heart failure type: systolic   Qualified Code(s): I50.23 - Acute on chronic 

systolic (congestive) heart failure   


Comment: ef of 10%, severe isch cardiomyopathy, class C   





(2) CAD (coronary artery disease)


Code(s): I25.10 - ATHSCL HEART DISEASE OF NATIVE CORONARY ARTERY W/O ANG PCTRS 

  Status: Chronic   


Qualifiers: 


   Coronary Disease-Associated Artery/Lesion type: native artery   Native vs. 

transplanted heart: native heart   Associated angina: without angina   

Qualified Code(s): I25.10 - Atherosclerotic heart disease of native coronary 

artery without angina pectoris   


Comment: prior stents x4   





(3) Bipolar disorder


Code(s): F31.9 - BIPOLAR DISORDER, UNSPECIFIED   Status: Chronic   


Qualifiers: 


   Active/Remission status: remission status unspecified   Qualified Code(s): 

F31.9 - Bipolar disorder, unspecified   





(4) h/o ivc filter


Status: Chronic   Comment: one of the leads is post to aorta? and is outside 

ivc   





(5) Chest pain


Code(s): R07.9 - CHEST PAIN, UNSPECIFIED   Status: Resolved   


Qualifiers: 


   Chest pain type: unspecified   Qualified Code(s): R07.9 - Chest pain, 

unspecified   





(6) Diabetes mellitus


Code(s): E11.9 - TYPE 2 DIABETES MELLITUS WITHOUT COMPLICATIONS   Status: 

Chronic   


Qualifiers: 


   Diabetes mellitus type: type 1   Diabetes mellitus complication status: with 

unspecified complications   Qualified Code(s): E10.8 - Type 1 diabetes mellitus 

with unspecified complications   





(7) Hypertension


Code(s): I10 - ESSENTIAL (PRIMARY) HYPERTENSION   Status: Chronic   


Qualifiers: 


   Hypertension type: essential hypertension   Qualified Code(s): I10 - 

Essential (primary) hypertension   





(8) Sleep apnea


Code(s): G47.30 - SLEEP APNEA, UNSPECIFIED   Status: Suspected   Comment: needs 

outpt sleep study   





- Plan


hemostable


-: dc pt home


-: to continue home meds along with lasix


-: BP and pulse twice daily check and record at home


-: outpt sleep study via PCP





* .

## 2018-12-23 NOTE — DIS
DATE OF ADMISSION:  12/20/2018



DATE OF DISCHARGE:  12/23/2018



DISCHARGE DISPOSITION:  Home.



PRIMARY DISCHARGE DIAGNOSES:  

1. Acute on chronic congestive heart failure exacerbation with ejection fraction of

around 10%. 

2. Severe ischemic cardiomyopathy, class C.

3. Coronary artery disease with prior stents placed.

4. Bipolar disorder.

5. 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

one of the leads of the filter and lying posterior to the aorta. 

6. Diabetes mellitus, type 1.

7. Hypertension.

8. Sleep apnea for outpatient workup and confirmation.



PROCEDURES DONE DURING HOSPITALIZATION:  CT of the abdomen and pelvis done on the

day of admission showed moderate large right and moderate left pleural effusions.

Mild cardiomegaly with tiny pericardial effusion.  Mild anasarca.  Likely small

angiomyolipoma measuring 1.3 cm in the midportion of left kidney.  IVC filter with

lateral struts extends outside of the confines of IVC and is located posterior to

the abdominal aorta with one of the anterior struts minimally extending outside of

the confines of the anterior wall of the IVC into the adjacent fat.  Echo with 2D

Doppler shows EF of 5% to 10%, moderately enlarged RV cavity.  Left atrium is

moderately dilated.  Moderate mitral regurgitation.  Severe tricuspid regurgitation. 



INPATIENT CONSULT:  Dr. Adame for Cardiology.



DISCHARGE MEDICATIONS:  

1. Lasix 40 mg p.o. twice daily.

2. Cozaar 25 mg p.o. daily.

3. Trazodone 50 mg p.o. at bedtime p.r.n. for insomnia.

4. Flomax 0.4 mg p.o. daily.

5. Ultram p.r.n. for pain.

6. Levemir 20 units subcu twice daily.

7. Glipizide 5 mg twice daily.

8. Gabapentin 300 mg p.o. 3 times daily.

9. Fluoxetine 20 mg daily.

10. Depakote 500 mg p.o. twice daily.

11. Coreg 6.25 mg p.o. twice daily.

12. Atorvastatin 10 mg p.o. at bedtime.

13. Aspirin 81 mg p.o. daily.



ALLERGIES:  ALLERGIC TO PLAVIX.



DISCHARGE PLAN:  The patient to follow up with Dr. Adame as advised and primary

care physician in 1 week. 



BRIEF COURSE DURING HOSPITALIZATION:  The patient initially came to ER with

complaints of chest pain and shortness of breath.  The patient has known history of

ischemic cardiomyopathy with low ejection fraction and prior AICD.  He had also

revealed that he had 4 stents placed.  He recently moved into this area from [place]

state.  He was gently diuresed for acute CHF exacerbation with systolic dysfunction

and ejection fraction of 5% to 10%.  He has had consultation with Dr. Adame as

well.  His overall prognosis is guarded due to severe ischemic cardiomyopathy with

very low ejection fraction.  He is ambulating and eating well.  He was counseled

with regard to medication and dietary compliance due to very low margin of error in

view of severe ischemic cardiomyopathy.  He is hemodynamically stable at present and

will be discharged home.  He is advised to check pulse and blood pressure twice

daily and record, to follow up with primary care physician in 1 week.  Please see a

face-to-face documentation for the day of discharge on [facility]. 







Job ID:  [id number]

## 2018-12-25 NOTE — EKG
Test Reason : CP

Blood Pressure : ***/*** mmHG

Vent. Rate : 090 BPM     Atrial Rate : 090 BPM

   P-R Int : 164 ms          QRS Dur : 086 ms

    QT Int : 408 ms       P-R-T Axes : 048 -52 -57 degrees

   QTc Int : 499 ms

 

Normal sinus rhythm

Left axis deviation

Nonspecific T wave abnormality

Abnormal ECG

 

Confirmed by ASHWIN RODRIGUEZ D.O. ([id number]),  MIRTHA MARTINEZ ([id number]) on 12/25/2018 12:02:23 PM

 

Referred By:             Confirmed By:ASHWIN RODRIGUEZ D.O.